# Patient Record
(demographics unavailable — no encounter records)

---

## 2025-04-23 NOTE — HISTORY OF PRESENT ILLNESS
[FreeTextEntry1] : Right neck mass [de-identified] : Izzy Baker is a 60 y.o. F PMH HTN presenting for a new patient visit. She recently was hospitalized from April 12-17th 2025 for a R neck mass that was biopsied and found to be papillary thyroid carcinoma. CT chest was performed with concerning findings for metastasis. She says she feels well and has no complaints other than concerns about the recent findings. Her and her daughter both said they were told of the cancer diagnosis and that they have hope that with treatment there will be improvement. I asked if they were told of the lung CT findings, in which she said that her lungs are affected but she was not completely sure of with what. Other than this, she has no active issues today.

## 2025-04-23 NOTE — PHYSICAL EXAM
[Alert and Oriented x3] : oriented to person, place, and time [Normal] : affect was normal and insight and judgment were intact [de-identified] : +R neck mass, tender to palpation

## 2025-04-23 NOTE — HISTORY OF PRESENT ILLNESS
[FreeTextEntry1] : Right neck mass [de-identified] : Izzy Baker is a 60 y.o. F PMH HTN presenting for a new patient visit. She recently was hospitalized from April 12-17th 2025 for a R neck mass that was biopsied and found to be papillary thyroid carcinoma. CT chest was performed with concerning findings for metastasis. She says she feels well and has no complaints other than concerns about the recent findings. Her and her daughter both said they were told of the cancer diagnosis and that they have hope that with treatment there will be improvement. I asked if they were told of the lung CT findings, in which she said that her lungs are affected but she was not completely sure of with what. Other than this, she has no active issues today.

## 2025-04-23 NOTE — INTERPRETER SERVICES
[Time Spent: ____ minutes] : Total time spent using  services: [unfilled] minutes. The patient's primary language is not English thus required  services. [Language Line ] : provided by Language Line   [Interpreters_IDNumber] : [id number] [Interpreters_FullName] : Antolin [TWNoteComboBox1] : Singaporean

## 2025-04-23 NOTE — HEALTH RISK ASSESSMENT
[Very Good] : ~his/her~  mood as very good [1 or 2 (0 pts)] : 1 or 2 (0 points) [Never (0 pts)] : Never (0 points) [No] : In the past 12 months have you used drugs other than those required for medical reasons? No [No falls in past year] : Patient reported no falls in the past year [0] : 2) Feeling down, depressed, or hopeless: Not at all (0) [PHQ-2 Negative - No further assessment needed] : PHQ-2 Negative - No further assessment needed [Access to Medications] : access to medications [With Family] : lives with family [# of Members in Household ___] :  household currently consist of [unfilled] member(s) [Unemployed] : unemployed [Less Than High School] : less than high school [] :  [# Of Children ___] : has [unfilled] children [Sexually Active] : sexually active [Feels Safe at Home] : Feels safe at home [Fully functional (bathing, dressing, toileting, transferring, walking, feeding)] : Fully functional (bathing, dressing, toileting, transferring, walking, feeding) [Fully functional (using the telephone, shopping, preparing meals, housekeeping, doing laundry, using] : Fully functional and needs no help or supervision to perform IADLs (using the telephone, shopping, preparing meals, housekeeping, doing laundry, using transportation, managing medications and managing finances) [Smoke Detector] : smoke detector [Carbon Monoxide Detector] : carbon monoxide detector [Seat Belt] :  uses seat belt [Sunscreen] : uses sunscreen [Never] : Never [NO] : No [ SDOH Screening] : Social service referral provided to patient and briefly addressed in the office. [Time Spent: ___ Minutes] : I spent [unfilled] minutes performing an SDOH assessment. [Reports normal functional visual acuity (ie: able to read med bottle)] : Reports normal functional visual acuity [Safety elements used in home] : safety elements used in home [FreeTextEntry1] : The neck mass [de-identified] : Recent hospitalization [de-identified] : None [de-identified] : Likes to walk  [de-identified] : Likes to eat everything she says  [EyeExamDate] : Never [Change in mental status noted] : No change in mental status noted [High Risk Behavior] : no high risk behavior [Reports changes in hearing] : Reports no changes in hearing [Reports changes in vision] : Reports no changes in vision [Reports changes in dental health] : Reports no changes in dental health [MammogramComments] : Never  [PapSmearComments] : Never  [ColonoscopyComments] : Never  [HIVComments] : Never [HepatitisCComments] : Never [de-identified] : Problem affording medication [de-identified] : trouble affording medication [de-identified] : unable to take necessary medications [de-identified] : enrolled in [facility] program

## 2025-04-23 NOTE — HEALTH RISK ASSESSMENT
[Very Good] : ~his/her~  mood as very good [1 or 2 (0 pts)] : 1 or 2 (0 points) [Never (0 pts)] : Never (0 points) [No] : In the past 12 months have you used drugs other than those required for medical reasons? No [No falls in past year] : Patient reported no falls in the past year [0] : 2) Feeling down, depressed, or hopeless: Not at all (0) [PHQ-2 Negative - No further assessment needed] : PHQ-2 Negative - No further assessment needed [Access to Medications] : access to medications [With Family] : lives with family [# of Members in Household ___] :  household currently consist of [unfilled] member(s) [Unemployed] : unemployed [Less Than High School] : less than high school [] :  [# Of Children ___] : has [unfilled] children [Sexually Active] : sexually active [Feels Safe at Home] : Feels safe at home [Fully functional (bathing, dressing, toileting, transferring, walking, feeding)] : Fully functional (bathing, dressing, toileting, transferring, walking, feeding) [Fully functional (using the telephone, shopping, preparing meals, housekeeping, doing laundry, using] : Fully functional and needs no help or supervision to perform IADLs (using the telephone, shopping, preparing meals, housekeeping, doing laundry, using transportation, managing medications and managing finances) [Smoke Detector] : smoke detector [Carbon Monoxide Detector] : carbon monoxide detector [Seat Belt] :  uses seat belt [Sunscreen] : uses sunscreen [Never] : Never [NO] : No [ SDOH Screening] : Social service referral provided to patient and briefly addressed in the office. [Time Spent: ___ Minutes] : I spent [unfilled] minutes performing an SDOH assessment. [Reports normal functional visual acuity (ie: able to read med bottle)] : Reports normal functional visual acuity [Safety elements used in home] : safety elements used in home [FreeTextEntry1] : The neck mass [de-identified] : Recent hospitalization [de-identified] : None [de-identified] : Likes to walk  [de-identified] : Likes to eat everything she says  [EyeExamDate] : Never [Change in mental status noted] : No change in mental status noted [High Risk Behavior] : no high risk behavior [Reports changes in hearing] : Reports no changes in hearing [Reports changes in vision] : Reports no changes in vision [Reports changes in dental health] : Reports no changes in dental health [MammogramComments] : Never  [PapSmearComments] : Never  [ColonoscopyComments] : Never  [HIVComments] : Never [HepatitisCComments] : Never [de-identified] : Problem affording medication [de-identified] : trouble affording medication [de-identified] : unable to take necessary medications [de-identified] : enrolled in [facility] program

## 2025-04-23 NOTE — INTERPRETER SERVICES
[Time Spent: ____ minutes] : Total time spent using  services: [unfilled] minutes. The patient's primary language is not English thus required  services. [Language Line ] : provided by [employer]   [Interpreters_IDNumber] : [id number] [Interpreters_FullName] : Antolin [TWNoteComboBox1] : Togolese

## 2025-04-23 NOTE — HEALTH RISK ASSESSMENT
[Very Good] : ~his/her~  mood as very good [1 or 2 (0 pts)] : 1 or 2 (0 points) [Never (0 pts)] : Never (0 points) [No] : In the past 12 months have you used drugs other than those required for medical reasons? No [No falls in past year] : Patient reported no falls in the past year [0] : 2) Feeling down, depressed, or hopeless: Not at all (0) [PHQ-2 Negative - No further assessment needed] : PHQ-2 Negative - No further assessment needed [Access to Medications] : access to medications [With Family] : lives with family [# of Members in Household ___] :  household currently consist of [unfilled] member(s) [Unemployed] : unemployed [Less Than High School] : less than high school [] :  [# Of Children ___] : has [unfilled] children [Sexually Active] : sexually active [Feels Safe at Home] : Feels safe at home [Fully functional (bathing, dressing, toileting, transferring, walking, feeding)] : Fully functional (bathing, dressing, toileting, transferring, walking, feeding) [Fully functional (using the telephone, shopping, preparing meals, housekeeping, doing laundry, using] : Fully functional and needs no help or supervision to perform IADLs (using the telephone, shopping, preparing meals, housekeeping, doing laundry, using transportation, managing medications and managing finances) [Smoke Detector] : smoke detector [Carbon Monoxide Detector] : carbon monoxide detector [Seat Belt] :  uses seat belt [Sunscreen] : uses sunscreen [Never] : Never [NO] : No [ SDOH Screening] : Social service referral provided to patient and briefly addressed in the office. [Time Spent: ___ Minutes] : I spent [unfilled] minutes performing an SDOH assessment. [Reports normal functional visual acuity (ie: able to read med bottle)] : Reports normal functional visual acuity [Safety elements used in home] : safety elements used in home [FreeTextEntry1] : The neck mass [de-identified] : Recent hospitalization [de-identified] : None [de-identified] : Likes to walk  [de-identified] : Likes to eat everything she says  [EyeExamDate] : Never [Change in mental status noted] : No change in mental status noted [High Risk Behavior] : no high risk behavior [Reports changes in hearing] : Reports no changes in hearing [Reports changes in vision] : Reports no changes in vision [Reports changes in dental health] : Reports no changes in dental health [MammogramComments] : Never  [PapSmearComments] : Never  [ColonoscopyComments] : Never  [HIVComments] : Never [HepatitisCComments] : Never [de-identified] : Problem affording medication [de-identified] : trouble affording medication [de-identified] : unable to take necessary medications [de-identified] : enrolled in [facility] program

## 2025-04-23 NOTE — PHYSICAL EXAM
[Alert and Oriented x3] : oriented to person, place, and time [Normal] : affect was normal and insight and judgment were intact [de-identified] : +R neck mass, tender to palpation

## 2025-04-23 NOTE — ASSESSMENT
[Vaccines Reviewed] : Immunizations reviewed today. Please see immunization details in the vaccine log within the immunization flowsheet.  [FreeTextEntry1] : Izzy Baker is a 60 y.o. F PMH HTN presenting for a new patient visit. She has a recent diagnosis of papillary thyroid carcinoma with CT chest findings concerns for metastatic. Other than this, she says she feels well and has no concerns.   #Papillary thyroid carcinoma #Concern for lung mets - TSH and fT4 ordered - Has a new patient appointment with oncologist Dr. Wheeler on May 2nd 2025. Pt aware of appt.  #HTN - C/w chlorthalidone  #HCM - Mammo ordered - GI referral for colonoscopy - ObGyn referral for pap - Pt did not wish for vaccines today. Will revisit. - Basic labs ordered  RTC 5 weeks TeleHealth for follow-up after oncology appt  Discussed case with Dr Derrick Castaneda MD PGY-1 Internal Medicine

## 2025-04-23 NOTE — PHYSICAL EXAM
[Alert and Oriented x3] : oriented to person, place, and time [Normal] : affect was normal and insight and judgment were intact [de-identified] : +R neck mass, tender to palpation

## 2025-04-23 NOTE — INTERPRETER SERVICES
[Time Spent: ____ minutes] : Total time spent using  services: [unfilled] minutes. The patient's primary language is not English thus required  services. [Language Line ] : provided by [employer]   [Interpreters_IDNumber] : [id number] [Interpreters_FullName] : Antolin [TWNoteComboBox1] : [place]

## 2025-04-23 NOTE — HISTORY OF PRESENT ILLNESS
[FreeTextEntry1] : Right neck mass [de-identified] : Izzy Baker is a 60 y.o. F PMH HTN presenting for a new patient visit. She recently was hospitalized from April 12-17th 2025 for a R neck mass that was biopsied and found to be papillary thyroid carcinoma. CT chest was performed with concerning findings for metastasis. She says she feels well and has no complaints other than concerns about the recent findings. Her and her daughter both said they were told of the cancer diagnosis and that they have hope that with treatment there will be improvement. I asked if they were told of the lung CT findings, in which she said that her lungs are affected but she was not completely sure of with what. Other than this, she has no active issues today.

## 2025-04-29 NOTE — CONSULT LETTER
[Dear  ___] : Dear  [unfilled], [Consult Letter:] : I had the pleasure of evaluating your patient, [unfilled]. [Please see my note below.] : Please see my note below. [Consult Closing:] : Thank you very much for allowing me to participate in the care of this patient.  If you have any questions, please do not hesitate to contact me. [Sincerely,] : Sincerely, [FreeTextEntry2] : Dr. Yayo Castaneda  [FreeTextEntry3] : Jos Harp MD FACS Division of Head and Neck Surgery Department of Otolaryngology  [facility]   of Otolaryngology Assistant Professor of Surgery [facility] [employer] at [facility]

## 2025-04-29 NOTE — REASON FOR VISIT
[Initial Evaluation] : an initial evaluation for [Other: ______] : provided by [facility] [FreeTextEntry2] : mets of PTCs with neck mass [Interpreters_IDNumber] : [id number] [Interpreters_FullName] : josr

## 2025-04-29 NOTE — REASON FOR VISIT
[Initial Evaluation] : an initial evaluation for [Other: ______] : provided by [facility] [FreeTextEntry2] : mets of PTCs with neck mass [Interpreters_IDNumber] : [id number] [Interpreters_FullName] : josr

## 2025-04-29 NOTE — PROCEDURE
[Gag Reflex] : gag reflex preventing mirror examination [None] : none [Flexible Endoscope] : examined with the flexible endoscope [Serial Number: ___] : Serial Number: [unfilled] [de-identified] : After patient consents, a FFL is performed.  No lesions in the NPx, OPx, HPx or larynx.  VC are mobile, airway patent.

## 2025-04-29 NOTE — DATA REVIEWED
[de-identified] : CT Neck independently interpreted - right neck with bulky conglomerate nodes in level IV and V, left neck with pathologic nodes in level IV.

## 2025-04-29 NOTE — PHYSICAL EXAM
[de-identified] : Approx. 5-6 cm mass in right level IV/V, firm approx. 2.5 cm mass in left level IV.  Both mobile, no LAD.   [Midline] : trachea located in midline position [Laryngoscopy Performed] : laryngoscopy was performed, see procedure section for findings [Normal] : no rashes

## 2025-04-29 NOTE — DATA REVIEWED
[de-identified] : CT Neck independently interpreted - right neck with bulky conglomerate nodes in level IV and V, left neck with pathologic nodes in level IV.

## 2025-04-29 NOTE — CONSULT LETTER
[Dear  ___] : Dear  [unfilled], [Consult Letter:] : I had the pleasure of evaluating your patient, [unfilled]. [Please see my note below.] : Please see my note below. [Consult Closing:] : Thank you very much for allowing me to participate in the care of this patient.  If you have any questions, please do not hesitate to contact me. [Sincerely,] : Sincerely, [FreeTextEntry2] : Dr. Yayo Castaneda  [FreeTextEntry3] : Jos Harp MD FACS Division of Head and Neck Surgery Department of Otolaryngology  [facility]   of Otolaryngology Assistant Professor of Surgery [employer] [facility] [employer] at [facility]

## 2025-04-29 NOTE — PROCEDURE
Patient is on medical hold and unable to be seen by Cardiac Rehabilitation at this time. Will follow to progress.     [Gag Reflex] : gag reflex preventing mirror examination [None] : none [Flexible Endoscope] : examined with the flexible endoscope [Serial Number: ___] : Serial Number: [unfilled] [de-identified] : After patient consents, a FFL is performed.  No lesions in the NPx, OPx, HPx or larynx.  VC are mobile, airway patent.

## 2025-04-29 NOTE — HISTORY OF PRESENT ILLNESS
[de-identified] : Patient referred by [facility] for new diag mets thyroid cancer.  R neck mass that was biopsied and found to be papillary thyroid carcinoma on 4/19/2025 sono 4/12/2025 , CT neck 4/2025  , CT chest was performed with concerning findings for metastasis PT is here to surgical consultation Pt c.o right neck mass swelling up for 5 months, slowly getting bigger, mild pain. Denies  dysphagia, odynophagia, dyspnea, dysphonia, nasal obstruction/bleeding, fever, weakness or weight loss.  no family of thyroid cancer or thyroid disease no exposure of radiation. non smoker

## 2025-04-29 NOTE — PHYSICAL EXAM
[de-identified] : Approx. 5-6 cm mass in right level IV/V, firm approx. 2.5 cm mass in left level IV.  Both mobile, no LAD.   [Midline] : trachea located in midline position [Laryngoscopy Performed] : laryngoscopy was performed, see procedure section for findings [Normal] : no rashes

## 2025-04-29 NOTE — HISTORY OF PRESENT ILLNESS
[de-identified] : Patient referred by [facility] for new diag mets thyroid cancer.  R neck mass that was biopsied and found to be papillary thyroid carcinoma on 4/19/2025 sono 4/12/2025 , CT neck 4/2025  , CT chest was performed with concerning findings for metastasis PT is here to surgical consultation Pt c.o right neck mass swelling up for 5 months, slowly getting bigger, mild pain. Denies  dysphagia, odynophagia, dyspnea, dysphonia, nasal obstruction/bleeding, fever, weakness or weight loss.  no family of thyroid cancer or thyroid disease no exposure of radiation. non smoker

## 2025-05-04 NOTE — ASSESSMENT
[FreeTextEntry1] : 60F presenting to medical oncology for an initial consultation for management of metastatic papillary thyroid cancer.    # Papillary thyroid carcinoma, metastatic  - Reviewed available laboratory, radiographic, and pathologic data with the patient and her daughter, using a  (see HPI for details), and in layman's terms.  - Expressed concern given the rate of growth of the disease, will ask pathology to add on BRAF to biopsy specimen.  - Explained the metastatic nature of her disease and overview of the recommended treatment, including surgery with ENT including total thyroidectomy and neck dissection, followed by DOMINGO, and eventually therapy with oral agents.  - Ordered Guardant 360  - Will obtain thyroid and baseline labs - Needs medical clearance for surgery, has appt with IM prior to proceeding to surgery.  - Is on a 6mo tourist visa, may need to set up ongoing cancer care in [facility] at the end of her visa.  - All questions and concerns answered to the best of my ability and to the patient's apparent satisfaction.  - Mason have telehealth visit second week of May to discuss NGS.

## 2025-05-04 NOTE — PHYSICAL EXAM
[Restricted in physically strenuous activity but ambulatory and able to carry out work of a light or sedentary nature] : Status 1- Restricted in physically strenuous activity but ambulatory and able to carry out work of a light or sedentary nature, e.g., light house work, office work [Normal] : affect appropriate [Ulcers] : no ulcers [Mucositis] : no mucositis [Thrush] : no thrush [de-identified] : Bulky R neck mass

## 2025-05-04 NOTE — HISTORY OF PRESENT ILLNESS
[Disease: _____________________] : Disease: [unfilled] [M: ___] : M[unfilled] [AJCC Stage: ____] : AJCC Stage: [unfilled] [ECOG Performance Status: 1 - Restricted in physically strenuous activity but ambulatory and able to carry out work of a light or sedentary nature] : Performance Status: 1 - Restricted in physically strenuous activity but ambulatory and able to carry out work of a light or sedentary nature, e.g., light house work, office work [de-identified] :  Marilynn [id number]  Patient is a 60-year-old Kyrgyz-speaking female from [facility] with a past medical history of hypertension and is presenting to medical oncology for an initial conusltation regarding management for a new diagnosis of metastatic thyroid cancer. She states she noticed a right neck mass about 5-6 months ago that has been progressively growing and now causing mild discomfort. She was admitted to [facility] 4/12/25 work further work-up. There, she was found to have level 4 lymphadenopathy, the largest a 4.3 x 4.7 x 4.6 cm nectrotic/cystic mass. A 1.7cm right thyroid mass also seen. CT chest with innumerable pulmonary metastases. She had a lymph node biopsy on 4/16/25 which revealed metastatic papillary thyroid carcinoma. She was evaluated by ENT Dr. Bethea who recommended thyroidectomy and bilateral neck dissection followed by DOMINGO. She has bulky disease. Denies issues with breathig or swallowing. No family history of cancers or blood diseases. Non-smoker, no EtOH or other drugs. No history of radiation or toxin exposure.  [de-identified] : Papillary Thyroid Carcinoma  [de-identified] : TSH 9.04, free T4 0.8 [de-identified] : 5/2/25: Initial visit with medical oncology.

## 2025-05-04 NOTE — PHYSICAL EXAM
[Restricted in physically strenuous activity but ambulatory and able to carry out work of a light or sedentary nature] : Status 1- Restricted in physically strenuous activity but ambulatory and able to carry out work of a light or sedentary nature, e.g., light house work, office work [Normal] : affect appropriate [Ulcers] : no ulcers [Mucositis] : no mucositis [Thrush] : no thrush [de-identified] : Bulky R neck mass

## 2025-05-04 NOTE — ASSESSMENT
[FreeTextEntry1] : 60F presenting to medical oncology for an initial consultation for management of metastatic papillary thyroid cancer.    # Papillary thyroid carcinoma, metastatic  - Reviewed available laboratory, radiographic, and pathologic data with the patient and her daughter, using a  (see HPI for details), and in layman's terms.  - Expressed concern given the rate of growth of the disease, will ask pathology to add on BRAF to biopsy specimen.  - Explained the metastatic nature of her disease and overview of the recommended treatment, including surgery with ENT including total thyroidectomy and neck dissection, followed by DOMINGO, and eventually therapy with oral agents.  - Ordered Guardant 360  - Will obtain thyroid and baseline labs - Needs medical clearance for surgery, has appt with IM prior to proceeding to surgery.  - Is on a 6mo tourist visa, may need to set up ongoing cancer care in [facility] at the end of her visa.  - All questions and concerns answered to the best of my ability and to the patient's apparent satisfaction.  - Mason have telehealth visit second week of May to discuss NGS.

## 2025-05-04 NOTE — HISTORY OF PRESENT ILLNESS
[Disease: _____________________] : Disease: [unfilled] [M: ___] : M[unfilled] [AJCC Stage: ____] : AJCC Stage: [unfilled] [ECOG Performance Status: 1 - Restricted in physically strenuous activity but ambulatory and able to carry out work of a light or sedentary nature] : Performance Status: 1 - Restricted in physically strenuous activity but ambulatory and able to carry out work of a light or sedentary nature, e.g., light house work, office work [de-identified] :  Marilynn [id number]  Patient is a 60-year-old Upper sorbian-speaking female from [facility] with a past medical history of hypertension and is presenting to medical oncology for an initial conusltation regarding management for a new diagnosis of metastatic thyroid cancer. She states she noticed a right neck mass about 5-6 months ago that has been progressively growing and now causing mild discomfort. She was admitted to [facility] 4/12/25 work further work-up. There, she was found to have level 4 lymphadenopathy, the largest a 4.3 x 4.7 x 4.6 cm nectrotic/cystic mass. A 1.7cm right thyroid mass also seen. CT chest with innumerable pulmonary metastases. She had a lymph node biopsy on 4/16/25 which revealed metastatic papillary thyroid carcinoma. She was evaluated by ENT Dr. Bethea who recommended thyroidectomy and bilateral neck dissection followed by DOMINGO. She has bulky disease. Denies issues with breathig or swallowing. No family history of cancers or blood diseases. Non-smoker, no EtOH or other drugs. No history of radiation or toxin exposure.  [de-identified] : Papillary Thyroid Carcinoma  [de-identified] : TSH 9.04, free T4 0.8 [de-identified] : 5/2/25: Initial visit with medical oncology.

## 2025-05-05 NOTE — PLAN
[FreeTextEntry1] : #Pre-op clearance - pt advised to hold chlorthalidone morning of surgery - ekg today - pt had recent blood work done, nothing abnormal on bmp

## 2025-05-05 NOTE — ASSESSMENT
[Patient Optimized for Surgery] : Patient optimized for surgery [No Further Testing Recommended] : no further testing recommended [FreeTextEntry4] : Pt is a 60 Kyrgyz speaking F from [facility] with HTN and recent diagnosis of metastatic papillary thyroid cancer presenting for follow up after seeing oncology and ENT for surgical clearance for thyroidectomy and bilateral neck dissection. Pt has no other medical conditions, has excellent functional capacity. Medically optimized for surgery.

## 2025-05-05 NOTE — INTERPRETER SERVICES
[Language Line ] : provided by [employer]   [Interpreters_IDNumber] : [id number] [Interpreters_FullName] : Easton Howell

## 2025-05-05 NOTE — HISTORY OF PRESENT ILLNESS
[de-identified] : Pt is a 60 Luxembourgish speaking F from [facility] with HTN and recent diagnosis of metastatic papillary thyroid cancer presenting for follow up after seeing oncology and ENT for surgical clearance. Pt feeling well overall in general, denies any symptoms. No lightheadednesss/dizziness, CP/SOB, abdominal pain, palpitations, diarrhea/constipation. ENT Dr. Bethea recommended thyroidectomy and bilateral neck dissection followed for DOMINGO. Denies issues with breathing/swallowing. Recently followed up with Dr. Wheeler, who explained the metastatic nature of disease and reviewed the recommended treatment.  [No Pertinent Cardiac History] : no history of aortic stenosis, atrial fibrillation, coronary artery disease, recent myocardial infarction, or implantable device/pacemaker [No Pertinent Pulmonary History] : no history of asthma, COPD, sleep apnea, or smoking [(Patient denies any chest pain, claudication, dyspnea on exertion, orthopnea, palpitations or syncope)] : Patient denies any chest pain, claudication, dyspnea on exertion, orthopnea, palpitations or syncope [Excellent (>10 METs)] : Excellent (>10 METs) [Family Member] : family member [Aortic Stenosis] : no aortic stenosis [Atrial Fibrillation] : no atrial fibrillation [Coronary Artery Disease] : no coronary artery disease [Recent Myocardial Infarction] : no recent myocardial infarction [Implantable Device/Pacemaker] : no implantable device/pacemaker [Asthma] : no asthma [COPD] : no COPD [Sleep Apnea] : no sleep apnea [Smoker] : not a smoker [Chronic Anticoagulation] : no chronic anticoagulation [Chronic Kidney Disease] : no chronic kidney disease [Diabetes] : no diabetes [FreeTextEntry1] : thyroidectomy and bilateral neck dissection [FreeTextEntry3] : Dr. Bethea [FreeTextEntry4] : Pt is a 60 Yemeni speaking F from [facility] with HTN and recent diagnosis of metastatic papillary thyroid cancer presenting for follow up after seeing oncology and ENT for surgical clearance. Pt feeling well overall in general, denies any symptoms. No lightheadedness/dizziness, CP/SOB, abdominal pain, palpitations, diarrhea/constipation. ENT Dr. Bethea recommended thyroidectomy and bilateral neck dissection followed for DOMINGO. Denies issues with breathing/swallowing. Recently followed up with Dr. Wheeler, who explained the metastatic nature of disease and reviewed the recommended treatment.

## 2025-05-05 NOTE — PHYSICAL EXAM
[No Acute Distress] : no acute distress [Well Nourished] : well nourished [Normal Sclera/Conjunctiva] : normal sclera/conjunctiva [PERRL] : pupils equal round and reactive to light [Normal Outer Ear/Nose] : the outer ears and nose were normal in appearance [Normal Oropharynx] : the oropharynx was normal [No JVD] : no jugular venous distention [No Respiratory Distress] : no respiratory distress  [No Accessory Muscle Use] : no accessory muscle use [Clear to Auscultation] : lungs were clear to auscultation bilaterally [Normal Rate] : normal rate  [Regular Rhythm] : with a regular rhythm [Normal S1, S2] : normal S1 and S2 [No Edema] : there was no peripheral edema [Soft] : abdomen soft [Non Tender] : non-tender [No HSM] : no HSM [No CVA Tenderness] : no CVA  tenderness [No Spinal Tenderness] : no spinal tenderness [No Joint Swelling] : no joint swelling [Grossly Normal Strength/Tone] : grossly normal strength/tone [No Rash] : no rash [Coordination Grossly Intact] : coordination grossly intact [No Focal Deficits] : no focal deficits [Normal Gait] : normal gait [Normal Affect] : the affect was normal [Normal Insight/Judgement] : insight and judgment were intact [de-identified] : Approx. 5-6 cm firm mobile mass on right side of neck

## 2025-05-05 NOTE — INTERPRETER SERVICES
[Language Line ] : provided by [employer]   [Interpreters_IDNumber] : [id number] [Interpreters_FullName] : Easton Howell

## 2025-05-05 NOTE — HISTORY OF PRESENT ILLNESS
[de-identified] : Pt is a 60 Bengali speaking F from [facility] with HTN and recent diagnosis of metastatic papillary thyroid cancer presenting for follow up after seeing oncology and ENT for surgical clearance. Pt feeling well overall in general, denies any symptoms. No lightheadednesss/dizziness, CP/SOB, abdominal pain, palpitations, diarrhea/constipation. ENT Dr. Bethea recommended thyroidectomy and bilateral neck dissection followed for DOMINGO. Denies issues with breathing/swallowing. Recently followed up with Dr. Wheeler, who explained the metastatic nature of disease and reviewed the recommended treatment.  [No Pertinent Cardiac History] : no history of aortic stenosis, atrial fibrillation, coronary artery disease, recent myocardial infarction, or implantable device/pacemaker [No Pertinent Pulmonary History] : no history of asthma, COPD, sleep apnea, or smoking [(Patient denies any chest pain, claudication, dyspnea on exertion, orthopnea, palpitations or syncope)] : Patient denies any chest pain, claudication, dyspnea on exertion, orthopnea, palpitations or syncope [Excellent (>10 METs)] : Excellent (>10 METs) [Family Member] : family member [Aortic Stenosis] : no aortic stenosis [Atrial Fibrillation] : no atrial fibrillation [Coronary Artery Disease] : no coronary artery disease [Recent Myocardial Infarction] : no recent myocardial infarction [Implantable Device/Pacemaker] : no implantable device/pacemaker [Asthma] : no asthma [COPD] : no COPD [Sleep Apnea] : no sleep apnea [Smoker] : not a smoker [Chronic Anticoagulation] : no chronic anticoagulation [Chronic Kidney Disease] : no chronic kidney disease [Diabetes] : no diabetes [FreeTextEntry1] : thyroidectomy and bilateral neck dissection [FreeTextEntry3] : Dr. Bethea [FreeTextEntry4] : Pt is a 60 Bahamian speaking F from [facility] with HTN and recent diagnosis of metastatic papillary thyroid cancer presenting for follow up after seeing oncology and ENT for surgical clearance. Pt feeling well overall in general, denies any symptoms. No lightheadedness/dizziness, CP/SOB, abdominal pain, palpitations, diarrhea/constipation. ENT Dr. Bethea recommended thyroidectomy and bilateral neck dissection followed for DOMINGO. Denies issues with breathing/swallowing. Recently followed up with Dr. Wheeler, who explained the metastatic nature of disease and reviewed the recommended treatment.

## 2025-05-05 NOTE — REVIEW OF SYSTEMS
[Fever] : no fever [Chills] : no chills [Night Sweats] : no night sweats [Recent Change In Weight] : ~T no recent weight change [Discharge] : no discharge [Pain] : no pain [Vision Problems] : no vision problems [Earache] : no earache [Hearing Loss] : no hearing loss [Sore Throat] : no sore throat [Chest Pain] : no chest pain [Orthopnea] : no orthopnea [Shortness Of Breath] : no shortness of breath [Wheezing] : no wheezing [Cough] : no cough [Abdominal Pain] : no abdominal pain [Nausea] : no nausea [Vomiting] : no vomiting [Dysuria] : no dysuria [Frequency] : no frequency [Joint Pain] : no joint pain [Muscle Pain] : no muscle pain [Itching] : no itching [Skin Rash] : no skin rash [Headache] : no headache [Dizziness] : no dizziness [Memory Loss] : no memory loss [Suicidal] : not suicidal [Insomnia] : no insomnia [Easy Bleeding] : no easy bleeding [Easy Bruising] : no easy bruising

## 2025-05-05 NOTE — PHYSICAL EXAM
[No Acute Distress] : no acute distress [Well Nourished] : well nourished [Normal Sclera/Conjunctiva] : normal sclera/conjunctiva [PERRL] : pupils equal round and reactive to light [Normal Outer Ear/Nose] : the outer ears and nose were normal in appearance [Normal Oropharynx] : the oropharynx was normal [No JVD] : no jugular venous distention [No Respiratory Distress] : no respiratory distress  [No Accessory Muscle Use] : no accessory muscle use [Clear to Auscultation] : lungs were clear to auscultation bilaterally [Normal Rate] : normal rate  [Regular Rhythm] : with a regular rhythm [Normal S1, S2] : normal S1 and S2 [No Edema] : there was no peripheral edema [Soft] : abdomen soft [Non Tender] : non-tender [No HSM] : no HSM [No CVA Tenderness] : no CVA  tenderness [No Spinal Tenderness] : no spinal tenderness [No Joint Swelling] : no joint swelling [Grossly Normal Strength/Tone] : grossly normal strength/tone [No Rash] : no rash [Coordination Grossly Intact] : coordination grossly intact [No Focal Deficits] : no focal deficits [Normal Gait] : normal gait [Normal Affect] : the affect was normal [Normal Insight/Judgement] : insight and judgment were intact [de-identified] : Approx. 5-6 cm firm mobile mass on right side of neck

## 2025-05-05 NOTE — ASSESSMENT
[Patient Optimized for Surgery] : Patient optimized for surgery [No Further Testing Recommended] : no further testing recommended [FreeTextEntry4] : Pt is a 60 Polish speaking F from [facility] with HTN and recent diagnosis of metastatic papillary thyroid cancer presenting for follow up after seeing oncology and ENT for surgical clearance for thyroidectomy and bilateral neck dissection. Pt has no other medical conditions, has excellent functional capacity. Medically optimized for surgery.

## 2025-05-13 NOTE — ASSESSMENT
[FreeTextEntry1] : 60F presenting to medical oncology for an initial consultation for management of metastatic papillary thyroid cancer.    # Papillary thyroid carcinoma, metastatic  - Reviewed available laboratory, radiographic, and pathologic data with the patient and her daughter, using a  (see HPI for details), and in layman's terms.  - Expressed concern given the rate of growth of the disease, will ask pathology to add on BRAF to biopsy specimen.  - Explained the metastatic nature of her disease and overview of the recommended treatment, including surgery with ENT including total thyroidectomy and neck dissection, followed by DOMINGO, and eventually therapy with oral agents.  - Ordered Guardant 360  - Will obtain thyroid and baseline labs - Needs medical clearance for surgery, has appt with IM prior to proceeding to surgery.  - Is on a 6mo tourist visa, may need to set up ongoing cancer care in [facility] at the end of her visa.  - All questions and concerns answered to the best of my ability and to the patient's apparent satisfaction.  - Mason have telehealth visit second week of May to discuss NGS.   5/13/25 had med clearance plan for surgery mid -May Guardant NGS pending will follow up after surgery all questions answered.

## 2025-05-13 NOTE — HISTORY OF PRESENT ILLNESS
[Disease: _____________________] : Disease: [unfilled] [M: ___] : M[unfilled] [AJCC Stage: ____] : AJCC Stage: [unfilled] [ECOG Performance Status: 1 - Restricted in physically strenuous activity but ambulatory and able to carry out work of a light or sedentary nature] : Performance Status: 1 - Restricted in physically strenuous activity but ambulatory and able to carry out work of a light or sedentary nature, e.g., light house work, office work [de-identified] :  Marilynn [id number]  Patient is a 60-year-old Chinese-speaking female from [facility] with a past medical history of hypertension and is presenting to medical oncology for an initial conusltation regarding management for a new diagnosis of metastatic thyroid cancer. She states she noticed a right neck mass about 5-6 months ago that has been progressively growing and now causing mild discomfort. She was admitted to [facility] 4/12/25 work further work-up. There, she was found to have level 4 lymphadenopathy, the largest a 4.3 x 4.7 x 4.6 cm nectrotic/cystic mass. A 1.7cm right thyroid mass also seen. CT chest with innumerable pulmonary metastases. She had a lymph node biopsy on 4/16/25 which revealed metastatic papillary thyroid carcinoma. She was evaluated by ENT Dr. Bethea who recommended thyroidectomy and bilateral neck dissection followed by DOMINGO. She has bulky disease. Denies issues with breathig or swallowing. No family history of cancers or blood diseases. Non-smoker, no EtOH or other drugs. No history of radiation or toxin exposure.  [de-identified] : Papillary Thyroid Carcinoma  [de-identified] : TSH 9.04, free T4 0.8 [de-identified] : 5/2/25: Initial visit with medical oncology.   5/13/25: no new symptoms, persistent rt neck mass, no dysphagia, dyspnea or dysphonia

## 2025-05-13 NOTE — PHYSICAL EXAM
[Restricted in physically strenuous activity but ambulatory and able to carry out work of a light or sedentary nature] : Status 1- Restricted in physically strenuous activity but ambulatory and able to carry out work of a light or sedentary nature, e.g., light house work, office work [Normal] : affect appropriate [Ulcers] : no ulcers [Mucositis] : no mucositis [Thrush] : no thrush [de-identified] : Bulky R neck mass

## 2025-05-19 NOTE — DISCUSSION/SUMMARY
[FreeTextEntry1] : 61 y/o F  with last menstrual period at 50 yrs of age with PMHx HTN, HLD and papillary thyroid carcinoma presents for new exam for pap smear d/t surgery on 25 for thyroid cancer that requires a pap smear. Pt with no previous pap smears. Mammogram performed 15 days ago with normal results. Pt with no DEXA or colonoscopy performed. Pt sexually active with , no complaints. Denies abnormal bleeding, abdominal pain, hematuria, dysuria, incontinence, masses.  #PAP smear/HPV - performed will f/u with results in about 2 weeks  #Mammogram - performed 15 days ago with negative findings per pt.   #DEXA - f/u for DEXA scan   #GI referral - f/u for colonoscopy  #PHQ9 - performed with result of 0. Spoke face to face for 5 minutes with no concerns of depression or anxiety. Will continue to monitor  Explained to pt plan and management and told to return to clinic with any abnormal vaginal bleeding or vaginal complaints, such as pain, dryness, dysuria/hematuria, etc. and will follow up for annual exam in 1 year. Pt agrees and has no further questions  JADIEL MagañaS Mendy, PAC

## 2025-05-19 NOTE — REASON FOR VISIT
[Initial] : an initial consultation for [Language Line ] : provided by [employer]   [Interpreters_IDNumber] : [id number] [Interpreters_FullName] : Keyanna

## 2025-05-19 NOTE — REVIEW OF SYSTEMS
Overall stable  Current meds include Wellbutrin, Effexor, Stelazine, and Seroquel    Continue follow-up with his psychiatrist as directed [Negative] : Heme/Lymph

## 2025-05-19 NOTE — HISTORY OF PRESENT ILLNESS
[FreeTextEntry1] : 59 y/o F  with last menstrual period at 50 years of age with PMHx HTN, HLD and papillary thyroid carcinoma presents for a new visit d/t surgery on 25 for thyroid cancer which required a pap smear. Pt has never received a pap smear before. Mammogram performed 15 days ago with no abnormal results. Has not seen gynecologist since birth of children. Pt sexually active with  no complaints. Denies hot flashes, headache, hematuria, dysuria, abnormal discharge, incontinence, abdominal pain, change in bowel movements, abnormal bleeding since menopause.    PMHx:  - HTN - HLD - Papillary thyroid carcinoma OBGYN hx: , vaginal deliveries in [facility], no complications Medications: unknown Hospitalizations/Surgeries: Denies Allergies: NKDA, NKEA, NKFA HCM: PCP with [facility]. Mammogram 15 days ago, normal results. Denies pap smear, DEXA scan, or colonoscopy FHx: Denies Social: Social alcohol use, denies cigarette, marijuana, illicit drug use or previous use. No previous STI or HIV testing. Sexually active with , no pain, vaginal dryness or bleeding noted. Pt feels safe and comfortable at home.

## 2025-05-19 NOTE — PHYSICAL EXAM
[Chaperoned Physical Exam] : A chaperone was present in the examining room during all aspects of the physical examination. [Appropriately responsive] : appropriately responsive [Alert] : alert [Thyroid Nodule] : thyroid nodule [Examination Of The Breasts] : a normal appearance [No Masses] : no breast masses were palpable [Vulvar Atrophy] : vulvar atrophy [Labia Majora] : normal [Labia Minora] : normal [Atrophy] : atrophy [Normal] : normal [Uterine Adnexae] : normal [FreeTextEntry2] : Bernadine Belcher [FreeTextEntry1] : LEWIS [No Acute Distress] : no acute distress [No Lymphadenopathy] : no lymphadenopathy [Soft] : soft [Non-tender] : non-tender [Non-distended] : non-distended [No HSM] : No HSM [No Lesions] : no lesions [No Mass] : no mass [Oriented x3] : oriented x3 [Normal Position] : in a normal position [Tenderness] : nontender [Enlarged ___ wks] : not enlarged

## 2025-05-28 NOTE — REASON FOR VISIT
[Subsequent Evaluation] : a subsequent evaluation for [FreeTextEntry2] : post total thyroidectomy and neck dissection [Family Member] : family member [Other: _____] : [unfilled]

## 2025-05-28 NOTE — HISTORY OF PRESENT ILLNESS
[de-identified] : 60y Female underwent TT, CND, B/L SND II-IV, reimplantation para 5/21. path pending   Pt is without any complaints and denies any pain or discomfort, or voice change. Pt also has no neck mass, no difficulty swallowing and no painful swallowing. no numbness or tingling, no change voice.  Pt is calcium carbonate 1250 mg (500 mg elemental calcium) oral tablet: 2 tab(s) TID, levothyroxine 112 mcg (0.112 mg) oral tablet: 1 tab(s) orally once a day, and Rocaltrol 0.25 mcg oral capsule: 1 cap(s) orally 2 times a day. Pt has an endo appt 7/31/2025 and will call Dr. Wheeler for an appt

## 2025-05-30 NOTE — ASSESSMENT
[FreeTextEntry1] : Izzy Kothari is a 60F PMG HTN and metastatic thyroid cancer s/p total thyroidectomy and neck dissection 5/21/25 presenting for a follow-up visit. No acute complaints today over TeleHealth and has been following up with med onc and ENT regarding thyroid cancer.  #Metastaic thyroid cancer -  S/p total thyroidectomy and neck dissection 5/21/25 - C/w levothyroxine 112 mcg QD - C/w oyster shell calcium 500mg two tabs TID - C/w calcitriol 0.25mg BID - F/u with med onc Dr Wheeler June 13th  #HTN - SBP noted to be evelated to 150s on May 19th - Before that reading was normotensive - Recommended to c/w chlorthalidone and to not eat salty foods - Recheck BP in 3 months  #HCM - Awaiting pap results from Gyn - Has GI appointment for screening colonoscopy  Discussed case with Dr. Rosas   RTC in 3 months for BP check  Yayo Castaneda MD PGY-1 Internal Medicine

## 2025-05-30 NOTE — HISTORY OF PRESENT ILLNESS
[Family Member] : family member [Home] : at home, [unfilled] , at the time of the visit. [Medical Office: ([facility])___] : at the medical office located in  [Telehealth (audio & video)] : This visit was provided via telehealth using real-time 2-way audio visual technology. [Verbal consent obtained from patient] : the patient, [unfilled] [FreeTextEntry1] : No chief complaint [de-identified] : Izzy Kothari is a 60F PMG HTN and metastatic thyroid cancer s/p total thyroidectomy and neck dissection 5/21/25 presenting for a follow-up visit. She says shes doing well after the surgery and has follow-up with med onc and ENT. Says She has been taking her new medications calcitriol, levothyroxine, and calcium. No new complaints today.

## 2025-06-10 NOTE — HISTORY OF PRESENT ILLNESS
[de-identified] : 60 year old woman s/p TT, CND, B/L SND II-IV, reimplantation para 5/21/2025 presents for follow-up.  Most recent labs 5/30/2025 Intact PTH 6; Calcium and Ionized Calcium WNL. Pt is calcium carbonate 1250 mg (500 mg elemental calcium) oral tablet: 2 tab(s) TID, levothyroxine 112 mcg (0.112 mg) oral tablet: 1 tab(s) orally once a day, and Calcitriol 0.25 mcg oral capsule: 1 cap(s) orally 2 times a day. Pt has an endo appt 7/31/2025 and Dr. Wheeler 6/13/2025 Patient reports Right neck pain/stiffness, Right shoulder pain and upper back pain since the surgery. Eating/drinking without any difficulty.  Patient denies globus sensation, dysphagia, odynophagia, dyspnea, dysphonia, hemoptysis or otalgia. Denies recent fevers/infections, chills, night sweats, unintentional weight loss.

## 2025-06-10 NOTE — PROCEDURE
[Gag Reflex] : gag reflex preventing mirror examination [None] : none [Flexible Endoscope] : examined with the flexible endoscope [Serial Number: ___] : Serial Number: [unfilled] [de-identified] : After patient consents, a FFL is performed.  No lesions in the NPx, OPx, HPx or larynx.  VC are mobile, airway patent.

## 2025-06-10 NOTE — PHYSICAL EXAM
[de-identified] : Incision healing well, postsurgical changes.  No collections, no LAD. [Midline] : trachea located in midline position [Laryngoscopy Performed] : laryngoscopy was performed, see procedure section for findings [Normal] : no rashes

## 2025-06-10 NOTE — REASON FOR VISIT
[Post-Operative Visit] : a post-operative visit [Language Line ] : provided by [employer]   [FreeTextEntry2] : Metastatic Papillary thyroid carcinoma to lymph nodes. [Interpreters_IDNumber] : [id number] [Interpreters_FullName] : Pippa [[id number]] : [place]

## 2025-06-13 NOTE — ASSESSMENT
[FreeTextEntry1] :   60 year old woman s/p TT, CND, B/L SND II-IV, reimplantation para 5/21/2025 presents for follow-up. Most recent labs 5/30/2025 Intact PTH 6; Calcium and Ionized Calcium WNL. Pt is calcium carbonate 1250 mg (500 mg elemental calcium) oral tablet: 2 tab(s) TID, levothyroxine 112 mcg (0.112 mg) oral tablet: 1 tab(s) orally once a day, and Calcitriol 0.25 mcg oral capsule: 1 cap(s) orally 2 times a day. Pt has an endo appt 7/31/2025  Patient reports Right neck pain/stiffness, Right shoulder pain and upper back pain since the surgery. Eating/drinking without any difficulty. Patient denies globus sensation, dysphagia, odynophagia, dyspnea, dysphonia, hemoptysis or otalgia. Denies recent fevers/infections, chills, night sweats, unintentional weight loss. High risk disease.  Refer to rad Onc per Dr. Harp, but also to nuclear med May just need DOMINGO Will discuss at TB plan for tandem PET and I-131 Request NGS on tumor  NGS per [facility] [id number] OV in 1 month Labs today

## 2025-06-13 NOTE — PHYSICAL EXAM
[Restricted in physically strenuous activity but ambulatory and able to carry out work of a light or sedentary nature] : Status 1- Restricted in physically strenuous activity but ambulatory and able to carry out work of a light or sedentary nature, e.g., light house work, office work [Normal] : affect appropriate [Ulcers] : no ulcers [Mucositis] : no mucositis [Thrush] : no thrush [de-identified] : healing well. no mass

## 2025-06-13 NOTE — HISTORY OF PRESENT ILLNESS
[place] ambulatory encounter  URGENT CARE OFFICE VISIT    SUBJECTIVE:  Abiel Oglesby is a 5 year old female who presented requesting evaluation for right earache for the last couple of days. No fever chills or sweats. No sore throat. No rhinorrhea. Occasional cough. No difficulty breathing. Denies nausea vomiting diarrhea or abdominal pain. No drainage from the ear. History is assisted by the patient's parents..    Review of systems:    A review of systems was performed and findings relevant to these symptoms are included in the HPI.     PAST HISTORIES:    ALLERGIES:  No Known Allergies    MEDICATIONS:  Current Outpatient Medications   Medication Sig   • amoxicillin (AMOXIL) 400 MG/5ML suspension Take 11 mLs by mouth 2 times daily for 10 days.   • polyethylene glycol (MIRALAX) powder 8.5 g in 4 ounces of fluid once or twice daily as needed for constipation.     No current facility-administered medications for this visit.        Past Medical History:   Diagnosis Date   • Normal  (single liveborn) 2014   • Otitis media           OBJECTIVE:  Physical Exam:    Visit Vitals  Pulse 93   Temp 99.4 °F (37.4 °C) (Temporal)   Resp 18   Ht 3' 9\" (1.143 m)   Wt 19.5 kg   SpO2 99%   BMI 14.93 kg/m²        CONSTITUTIONAL: Well-hydrated, well-nourished female who appears to be in no acute distress. Awake, alert and cooperative.  HEENT: Normocephalic. No facial swelling or tenderness. EOMI, PERRLA, conjunctiva clear. Left TM is normal with normal external auditory canal. Right TM is erythematous and bulging with purulent fluid behind the TM consistent with otitis media. External auditory canal is clear.. External ears without deformities. Hearing is grossly normal. Nose without deformities. There is moist nasal mucosa. Throat is clear with moist mucous membranes.   NECK: Bilateral anterocervical adenopathy is present. Minimal tenderness. No nuchal rigidity.. There is full range of motion. There is no tenderness  [Disease: _____________________] : Disease: [unfilled] noted.  LUNGS: Clear to auscultation bilaterally. No wheezes, rales or rhonchi noted.   CARDIOVASCULAR: Regular rate and rhythm with normal S1 and S2. There are no murmurs auscultated.     SKIN: Warm, dry, intact without rash or lesion.  NEUROLOGIC: Alert , smiling and quite cooperative. Cranial nerves intact. Age-appropriate behavior.      URGENT CARE COURSE:  Patient has right otitis media-placed on amoxicillin. Advised OTC analgesics and follow-up with PCP or return to clinic if not improving with treatment.    Assessment:  1. Right acute otitis media          PLAN:  Orders Placed This Encounter   • amoxicillin (AMOXIL) 400 MG/5ML suspension       FOLLOW-UP:  PCP p.r.n.   PATIENT INSTRUCTIONS:  Amoxicillin for 10 days. OTC analgesics p.r.n.    The patient was advised to follow up with primary physician or to recheck with the urgent care clinic sooner if symptoms get worse or if new symptoms appear.    The mother and father indicated understanding of the diagnosis and agreed with the plan of care.     [M: ___] : M[unfilled] [AJCC Stage: ____] : AJCC Stage: [unfilled] [ECOG Performance Status: 1 - Restricted in physically strenuous activity but ambulatory and able to carry out work of a light or sedentary nature] : Performance Status: 1 - Restricted in physically strenuous activity but ambulatory and able to carry out work of a light or sedentary nature, e.g., light house work, office work [de-identified] :  Marilynn [id number]  Patient is a 60-year-old Lithuanian-speaking female from [facility] with a past medical history of hypertension and is presenting to medical oncology for an initial conusltation regarding management for a new diagnosis of metastatic thyroid cancer. She states she noticed a right neck mass about 5-6 months ago that has been progressively growing and now causing mild discomfort. She was admitted to [facility] 4/12/25 work further work-up. There, she was found to have level 4 lymphadenopathy, the largest a 4.3 x 4.7 x 4.6 cm nectrotic/cystic mass. A 1.7cm right thyroid mass also seen. CT chest with innumerable pulmonary metastases. She had a lymph node biopsy on 4/16/25 which revealed metastatic papillary thyroid carcinoma. She was evaluated by ENT Dr. Bethea who recommended thyroidectomy and bilateral neck dissection followed by DOMINGO. She has bulky disease. Denies issues with breathig or swallowing. No family history of cancers or blood diseases. Non-smoker, no EtOH or other drugs. No history of radiation or toxin exposure.  [de-identified] : Papillary Thyroid Carcinoma  [de-identified] : TSH 9.04, free T4 0.8 [de-identified] : 5/2/25: Initial visit with medical oncology.   5/13/25: no new symptoms, persistent rt neck mass, no dysphagia, dyspnea or dysphonia  6/13/25:   s/p TT, CND, B/L SND II-IV, reimplantation para 5/21/2025 presents for follow-up. Most recent labs 5/30/2025 Intact PTH 6; Calcium and Ionized Calcium WNL. Pt is calcium carbonate 1250 mg (500 mg elemental calcium) oral tablet: 2 tab(s) TID, levothyroxine 112 mcg (0.112 mg) oral tablet: 1 tab(s) orally once a day, and Calcitriol 0.25 mcg oral capsule: 1 cap(s) orally 2 times a day. Pt has an endo appt 7/31/2025 Patient reports Right neck pain/stiffness, Right shoulder pain and upper back pain since the surgery. Eating/drinking without any difficulty. Patient denies globus sensation, dysphagia, odynophagia, dyspnea, dysphonia, hemoptysis or otalgia. Denies recent fevers/infections, chills, night sweats, unintentional weight loss. TG pre- 253 Surgical path report  . Thyroid, total thyroidectomy - Papillary thyroid carcinoma, see synoptic summary - Carcinoma is present on circumferential margins - Adenomatoid nodule (Thyroid follicular nodular disease)  4. Lymph nodes and thymus, central compartment, neck dissection and cervical thymectomy - 13 lymph nodes positive for metastatic carcinoma, with extranodal extension - Thymic tissue is also seen  5. Lymph nodes, additional left level 4, excision -  1 out of 2 lymph nodes positive for metastatic carcinoma

## 2025-06-24 NOTE — REVIEW OF SYSTEMS
[Negative] : Allergic/Immunologic [Dysphagia: Grade 0] : Dysphagia: Grade 0 [Nausea: Grade 0] : Nausea: Grade 0 [Vomiting: Grade 0] : Vomiting: Grade 0 [Fatigue: Grade 0] : Fatigue: Grade 0 [Neck Edema: Grade 0] : Neck Edema: Grade 0 [Tinnitus - Grade 0] : Tinnitus - Grade 0 [Blurred Vision: Grade 0] : Blurred Vision: Grade 0 [Xerostomia: Grade 0] : Xerostomia: Grade 0 [Oral Pain: Grade 0] : Oral Pain: Grade 0 [Dysgeusia: Grade 0] : Dysgeusia: Grade 0 [Cough: Grade 0] : Cough: Grade 0 [Dyspnea: Grade 0] : Dyspnea: Grade 0 [Hoarseness: Grade 0] : Hoarseness: Grade 0 [Voice Alteration: Grade 0] : Voice Alteration: Grade 0 [Pruritus: Grade 0] : Pruritus: Grade 0

## 2025-06-26 NOTE — PHYSICAL EXAM
[Alert] : alert [Well Nourished] : well nourished [Healthy Appearance] : healthy appearance [No Acute Distress] : no acute distress [EOMI] : extra ocular movement intact [PERRL] : pupils equal, round and reactive to light [No Lid Lag] : no lid lag [Normal Hearing] : hearing was normal [No Thyroid Nodules] : no palpable thyroid nodules [No Accessory Muscle Use] : no accessory muscle use [Normal Rate and Effort] : normal respiratory rate and effort [Clear to Auscultation] : lungs were clear to auscultation bilaterally [Normal Rate] : heart rate was normal [Regular Rhythm] : with a regular rhythm [No Edema] : no peripheral edema [Pedal Pulses Normal] : the pedal pulses are present [Normal Bowel Sounds] : normal bowel sounds [Not Tender] : non-tender [Soft] : abdomen soft [Spine Straight] : spine straight [No Involuntary Movements] : no involuntary movements were seen [No Tremors] : no tremors [Oriented x3] : oriented to person, place, and time [Normal Affect] : the affect was normal [Normal Insight/Judgement] : insight and judgment were intact [Normal Mood] : the mood was normal [Well Healed Scar] : well healed scar [Normal Gait] : normal gait [de-identified] : -Gale [de-identified] : -Yaakov

## 2025-06-26 NOTE — REASON FOR VISIT
[Initial Evaluation] : an initial evaluation [Hypothyroidism] : hypothyroidism [Hypoparathyroidism] : hypoparathyroidism [Language Line ] : provided by Language Line   [Time Spent: ____ minutes] : Total time spent using  services: [unfilled] minutes. The patient's primary language is not English thus required  services. [Interpreters_IDNumber] : [id number] [Interpreters_FullName] : Tiffanie [TWNoteComboBox1] : Eritrean

## 2025-06-26 NOTE — ASSESSMENT
[FreeTextEntry1] : 60F with history of HTN, metastatic PTC now s/p total thyroidectomy with CND and b/l SND with reimplantation of parathyroid glands on 5/21. Hospital course c/b post-operative hypocalcemia though asx. Patient being seen for post-hospital visit to establish care.  #Metastatic PTC #s/p total thyroidectomy #Postoperative hypothyroidism - Dx on FNA following [facility] hospitalization in 4/2025 (had progressively growing R neck mass x 5-6 mo prior) - CT neck with 4.3 x 4.7 x 4.6 cm R level ROSMERY cystic/solid mass with punctate calcifications and subcm lung nodules - FNA revealed PTC, now s/p total thyroidecftomy 5/21/25 with CND and b/l SND with reimplantation of parathyroids - 4/2025 TSH 7, FT4 0.8,  (elevated) with TGAb 17.5 - Started on LT4 112mcg, taking at 5am with calcium supplement - 6/2025 TSH 1.53, unsuppressed - 6/2025 TG 30.20, TGAb 17.9 - Planned for RT x6 weeks with Dr. Wheeler starting in July PLAN: - Increase LT4 to 125 mcg. Instructed patient to take on empty stomach, 30 min before food or other meds, and 4 hours prior to calcium supplement - Goal TSH <0.10 given high risk PTC - Will repeat TFTs next visit - Repeat TG/TGAb once patient returns from [facility] in January 2026  #Postoperative hypoparathyroidism  #Hypocalcemia  - s/p total thyroidectomy 5/21/25  - 5/21/25 Ca 9.1 with PTH 3 - 6/24 Ca 9.6 (high normal) with PTH 6 (low) - Currently on Oyster shell calcium 1000 mg tidac, Rocal 0.25 mcg bid  PLAN: - Obtain 24h urine calcium with 24h urine creatinine (instructions provided in Icelandic) - if hypocalciuric can continue with current doses of medications; if hypercalciuric will add thiazide and reduce dose of current meds - Change Oyster shell calcium to 1500 mg bid with lunch and dinner - c/w calcitriol 0.25 mcg bid  Of note, pt is planning to go to [facility] from end of September 2025 to mid-January 2026.  Will plan to see her back in clinic at the end of August 2025.  Case seen and d/w Dr. Martinez Banda MD Endocrinology Fellow

## 2025-06-26 NOTE — REVIEW OF SYSTEMS
[Neck Pain] : neck pain [Fatigue] : no fatigue [Decreased Appetite] : appetite not decreased [Recent Weight Gain (___ Lbs)] : no recent weight gain [Recent Weight Loss (___ Lbs)] : no recent weight loss [Blurred Vision] : no blurred vision [Dysphagia] : no dysphagia [Dysphonia] : no dysphonia [Chest Pain] : no chest pain [Palpitations] : no palpitations [Shortness Of Breath] : no shortness of breath [Nausea] : no nausea [Constipation] : no constipation [Abdominal Pain] : no abdominal pain [Vomiting] : no vomiting [Diarrhea] : no diarrhea [Polyuria] : no polyuria [Muscle Weakness] : no muscle weakness [Pain/Numbness of Digits] : no pain/numbness of digits [Polydipsia] : no polydipsia

## 2025-06-26 NOTE — HISTORY OF PRESENT ILLNESS
[FreeTextEntry1] : 60F with history of HTN, metastatic PTC now s/p total thyroidectomy with CND and b/l SND with reimplantation of parathyroid glands on 5/21. Hospital course c/b post-operative hypocalcemia though asx. Patient being seen for post-hospital visit to establish care.  Per hospital note: Pt recently dx with PTC on FNA following [facility] hospitalization in 4/2025. Pt had noticed progressively growing R neck mass x 5-6 mo prior to initial presentation. Work up at [facility] showed 4.3 x 4.7 x 4.6 cm R level ROSMERY cystic/solid mass with punctate calcifications and subcm lung nodules on CT neck. FNA at that time revealed PTC.  Labs from 4/2025 hospitalization showed hypothyroidism with TSH 7 and FT4 0.8. TG elevated 207 with TGAb 17.5.  s/p total thyroidectomy 5/21 with CND and b/l SND with reimplantation of parathyroids. 5/21 Ca 9.1 with PTH 3 During hospitalization, patient with hypocalcemia but asx. Was started on calcium carbonate 2500 mg tidac, Rocal 0.5 mcg bid, cholecalciferol 2000 units daily, and PO LT4 112 mcg   Today's visit: Currently taking Oyster shell calcium 1000 mg tid, calcitriol 0.25 mcg bid and LT4 112 mcg. Says she was not discharged on vitamin D3.  Is taking LT4 112 mcg with calcium at 5am.  6/2025 labs:  Ca 6/24 9.6, iPTH 6 TSH 1.53 TG 30.20 (from 253), TGAb 17.9 (from <15) Says that prior to dx, she was not experiencing any sxs. Reports perioral numbness since the surgery as well as some neck swelling, no dysphagia or SOB. Denies extremity numbness/tingling, spasm/twitching, weakness.   [facility] vs. [facility]. Patient is planned for RT x6 weeks starting in July  FHx No thyroid cancer or other thyroid disease  SHx No tobacco or alcohol use

## 2025-07-03 NOTE — REASON FOR VISIT
[Consideration of Curative Therapy] : consideration of curative therapy for [Other: ___] : [unfilled] [Interpreters_IDNumber] : [id number] [Interpreters_FullName] : Haydee

## 2025-07-03 NOTE — PROCEDURE
[Gag Reflex] : gag reflex preventing mirror examination [None] : none [Flexible Endoscope] : examined with the flexible endoscope [Serial Number: ___] : Serial Number: [unfilled] [de-identified] : After patient consents, a FFL is performed.  No lesions in the NPx, OPx, HPx or larynx.  VC are mobile, airway patent.

## 2025-07-03 NOTE — PHYSICAL EXAM
[] : no respiratory distress [Respiration, Rhythm And Depth] : normal respiratory rhythm and effort [Exaggerated Use Of Accessory Muscles For Inspiration] : no accessory muscle use [Normal] : oriented to person, place and time, the affect was normal, the mood was normal and not anxious [de-identified] : neck supple, scar well healed

## 2025-07-03 NOTE — REASON FOR VISIT
[Consideration of Curative Therapy] : consideration of curative therapy for [Other: ___] : [unfilled] [Interpreters_IDNumber] : [id number] [Interpreters_FullName] : Haydee

## 2025-07-03 NOTE — REASON FOR VISIT
[Consideration of Curative Therapy] : consideration of curative therapy for [Other: ___] : [unfilled] [Interpreters_IDNumber] : [id number] [Interpreters_FullName] : Haydee

## 2025-07-03 NOTE — HISTORY OF PRESENT ILLNESS
[FreeTextEntry1] : Ms. Sauceda is a 59 yo woman with newly digagnosed metastatic thyroid cancer.   Her HPI as I understand it is summarized below:  She presented to [facility] hospital with a right neck mass in April of this year. She had had the swelling for approxmately 5 months and noted it to be slowly getting bigger. It was also mildly painful.   4/11/2025: CT Neck- Large cystic/necrotic mass with internal enhancing soft tissue in right level 4A measuring 4.3 x 4.7 x 4.6 cm, right level 2B measuring 2.2 x 1.6 x 2.2 cm, right level 4B 2.1 x 3.1 x 1.6 cm, left level 4A 2 x 2.3 x 1.8 cm.  4/12/2025: US thyroid- right lobe 3.2 x 2.0x 1.7 cm lesion. Loss of fat plane between right level 4A mass and sternoclenomastoid muscle and associated with compression of internal jugular vein. Subcentimeter nodules in lung apices.   4/12/2025: CT CAP- innumerable bilateral pulmonary nodules largest measuring 8 mm in the right middle lobe concerning for metastatatic disease.  4/19/2025: right mid and Cervical level 4 right lymph node biopsy showed papillary thyroid carcinoma  5/20/2025: She underwent a total thyroidectomy with central compartment neck dissection with cervical thymectomy, reimplanation of left superior parathyroid gland, and bilateral neck dissection with Dr. Harp. Pathology showed Papillary thyroid carcinoma present on the circumferential margins, 13 central lymph nodes positive, 5/16 right lymph nodes positive with extranodal extension noted. Left lymph node dissection showed 1/7 level 3 lymph nodes positive with extranodal extension, 3/12 Level 4 lymph nodes positive, and 2/4 level 5A lymph nodes positive. Total 25 bilateral lymph nodes in right II-V and left III-V, largest measuring 9.3 cm. pT2N1b  6/24/2025: MS. Sauceda presents today for discussion of radiation therapy for metastatic thyroid cancer. She follows with Dr. Wheeler. DOMINGO vs RT discussed. She was discussed at Tumor board. NGS on tumor was sent 5/13. She also ordered a PET/CT  which is scheduled for July. She is healing from her surgery well with some skin tightness at the scar.

## 2025-07-03 NOTE — HISTORY OF PRESENT ILLNESS
[FreeTextEntry1] : Ms. Sauceda is a 59 yo woman with newly digagnosed metastatic thyroid cancer.   Her HPI as I understand it is summarized below:  She presented to [facility] hospital with a right neck mass in April of this year. She had had the swelling for approxmately 5 months and noted it to be slowly getting bigger. It was also mildly painful.   4/11/2025: CT Neck- Large cystic/necrotic mass with internal enhancing soft tissue in right level 4A measuring 4.3 x 4.7 x 4.6 cm, right level 2B measuring 2.2 x 1.6 x 2.2 cm, right level 4B 2.1 x 3.1 x 1.6 cm, left level 4A 2 x 2.3 x 1.8 cm.  4/12/2025: US thyroid- right lobe 3.2 x 2.0x 1.7 cm lesion. Loss of fat plane between right level 4A mass and sternoclenomastoid muscle and associated with compression of internal jugular vein. Subcentimeter nodules in lung apices.   4/12/2025: CT CAP- innumerable bilateral pulmonary nodules largest measuring 8 mm in the right middle lobe concerning for metastatatic disease.  4/19/2025: right mid and Cervical level 4 right lymph node biopsy showed papillary thyroid carcinoma  5/20/2025: She underwent a total thyroidectomy with central compartment neck dissection with cervical thymectomy, reimplanation of left superior parathyroid gland, and bilateral neck dissection with Dr. Harp. Pathology showed Papillary thyroid carcinoma present on the circumferential margins, 13 central lymph nodes positive, 5/16 right lymph nodes positive with extranodal extension noted. Left lymph node dissection showed 1/7 level 3 lymph nodes positive with extranodal extension, 3/12 Level 4 lymph nodes positive, and 2/4 level 5A lymph nodes positive. Total 25 bilateral lymph nodes in right II-V and left III-V, largest measuring 9.3 cm. pT2N1b  6/24/2025: MS. Sauceda presents today for discussion of radiation therapy for metastatic thyroid cancer. She follows with Dr. Wheeler. DOMINGO vs RT discussed. She was discussed at Tumor board. NGS on tumor was sent 5/13. She also ordered a PET/CT  which is scheduled for July. She is healing from her surgery well with some skin tightness at the scar.

## 2025-07-03 NOTE — PHYSICAL EXAM
[] : no respiratory distress [Respiration, Rhythm And Depth] : normal respiratory rhythm and effort [Exaggerated Use Of Accessory Muscles For Inspiration] : no accessory muscle use [Normal] : oriented to person, place and time, the affect was normal, the mood was normal and not anxious [de-identified] : neck supple, scar well healed

## 2025-07-03 NOTE — PROCEDURE
[Gag Reflex] : gag reflex preventing mirror examination [None] : none [Flexible Endoscope] : examined with the flexible endoscope [Serial Number: ___] : Serial Number: [unfilled] [de-identified] : After patient consents, a FFL is performed.  No lesions in the NPx, OPx, HPx or larynx.  VC are mobile, airway patent.

## 2025-07-03 NOTE — PHYSICAL EXAM
[] : no respiratory distress [Respiration, Rhythm And Depth] : normal respiratory rhythm and effort [Exaggerated Use Of Accessory Muscles For Inspiration] : no accessory muscle use [Normal] : oriented to person, place and time, the affect was normal, the mood was normal and not anxious [de-identified] : neck supple, scar well healed

## 2025-07-03 NOTE — HISTORY OF PRESENT ILLNESS
[FreeTextEntry1] : Ms. Sauceda is a 61 yo woman with newly digagnosed metastatic thyroid cancer.   Her HPI as I understand it is summarized below:  She presented to [facility] hospital with a right neck mass in April of this year. She had had the swelling for approxmately 5 months and noted it to be slowly getting bigger. It was also mildly painful.   4/11/2025: CT Neck- Large cystic/necrotic mass with internal enhancing soft tissue in right level 4A measuring 4.3 x 4.7 x 4.6 cm, right level 2B measuring 2.2 x 1.6 x 2.2 cm, right level 4B 2.1 x 3.1 x 1.6 cm, left level 4A 2 x 2.3 x 1.8 cm.  4/12/2025: US thyroid- right lobe 3.2 x 2.0x 1.7 cm lesion. Loss of fat plane between right level 4A mass and sternoclenomastoid muscle and associated with compression of internal jugular vein. Subcentimeter nodules in lung apices.   4/12/2025: CT CAP- innumerable bilateral pulmonary nodules largest measuring 8 mm in the right middle lobe concerning for metastatatic disease.  4/19/2025: right mid and Cervical level 4 right lymph node biopsy showed papillary thyroid carcinoma  5/20/2025: She underwent a total thyroidectomy with central compartment neck dissection with cervical thymectomy, reimplanation of left superior parathyroid gland, and bilateral neck dissection with Dr. Harp. Pathology showed Papillary thyroid carcinoma present on the circumferential margins, 13 central lymph nodes positive, 5/16 right lymph nodes positive with extranodal extension noted. Left lymph node dissection showed 1/7 level 3 lymph nodes positive with extranodal extension, 3/12 Level 4 lymph nodes positive, and 2/4 level 5A lymph nodes positive. Total 25 bilateral lymph nodes in right II-V and left III-V, largest measuring 9.3 cm. pT2N1b  6/24/2025: MS. Sauceda presents today for discussion of radiation therapy for metastatic thyroid cancer. She follows with Dr. Wheeler. DOMINGO vs RT discussed. She was discussed at Tumor board. NGS on tumor was sent 5/13. She also ordered a PET/CT  which is scheduled for July. She is healing from her surgery well with some skin tightness at the scar.

## 2025-07-03 NOTE — REASON FOR VISIT
[Consideration of Curative Therapy] : consideration of curative therapy for [Other: ___] : [unfilled] [Interpreters_IDNumber] : [id number] [Interpreters_FullName] : Haydee

## 2025-07-03 NOTE — PHYSICAL EXAM
[] : no respiratory distress [Respiration, Rhythm And Depth] : normal respiratory rhythm and effort [Exaggerated Use Of Accessory Muscles For Inspiration] : no accessory muscle use [Normal] : oriented to person, place and time, the affect was normal, the mood was normal and not anxious [de-identified] : neck supple, scar well healed

## 2025-07-03 NOTE — HISTORY OF PRESENT ILLNESS
[FreeTextEntry1] : Ms. Sauceda is a 61 yo woman with newly digagnosed metastatic thyroid cancer.   Her HPI as I understand it is summarized below:  She presented to [facility] hospital with a right neck mass in April of this year. She had had the swelling for approxmately 5 months and noted it to be slowly getting bigger. It was also mildly painful.   4/11/2025: CT Neck- Large cystic/necrotic mass with internal enhancing soft tissue in right level 4A measuring 4.3 x 4.7 x 4.6 cm, right level 2B measuring 2.2 x 1.6 x 2.2 cm, right level 4B 2.1 x 3.1 x 1.6 cm, left level 4A 2 x 2.3 x 1.8 cm.  4/12/2025: US thyroid- right lobe 3.2 x 2.0x 1.7 cm lesion. Loss of fat plane between right level 4A mass and sternoclenomastoid muscle and associated with compression of internal jugular vein. Subcentimeter nodules in lung apices.   4/12/2025: CT CAP- innumerable bilateral pulmonary nodules largest measuring 8 mm in the right middle lobe concerning for metastatatic disease.  4/19/2025: right mid and Cervical level 4 right lymph node biopsy showed papillary thyroid carcinoma  5/20/2025: She underwent a total thyroidectomy with central compartment neck dissection with cervical thymectomy, reimplanation of left superior parathyroid gland, and bilateral neck dissection with Dr. Harp. Pathology showed Papillary thyroid carcinoma present on the circumferential margins, 13 central lymph nodes positive, 5/16 right lymph nodes positive with extranodal extension noted. Left lymph node dissection showed 1/7 level 3 lymph nodes positive with extranodal extension, 3/12 Level 4 lymph nodes positive, and 2/4 level 5A lymph nodes positive. Total 25 bilateral lymph nodes in right II-V and left III-V, largest measuring 9.3 cm. pT2N1b  6/24/2025: MS. Sauceda presents today for discussion of radiation therapy for metastatic thyroid cancer. She follows with Dr. Wheeler. DOMINGO vs RT discussed. She was discussed at Tumor board. NGS on tumor was sent 5/13. She also ordered a PET/CT  which is scheduled for July. She is healing from her surgery well with some skin tightness at the scar.

## 2025-07-08 NOTE — REASON FOR VISIT
[Post-Operative Visit] : a post-operative visit [Language Line ] : provided by Language Line   [Interpreters_IDNumber] : [id number] [Interpreters_FullName] : Pippa [TWNoteComboBox1] : Croatian [Family Member] : family member [Other: _____] : [unfilled] [FreeTextEntry2] : Metastatic Papillary thyroid carcinoma to lymph nodes.

## 2025-07-08 NOTE — REASON FOR VISIT
[Post-Operative Visit] : a post-operative visit [Language Line ] : provided by Language Line   [Interpreters_IDNumber] : [id number] [Interpreters_FullName] : Pippa [[email or web address]] : Guinean [Family Member] : family member [Other: _____] : [unfilled] [FreeTextEntry2] : Metastatic Papillary thyroid carcinoma to lymph nodes.

## 2025-07-08 NOTE — HISTORY OF PRESENT ILLNESS
[de-identified] : 60 year old woman s/p TT, CND, B/L SND II-IV, reimplantation para 5/21/2025 presents for follow-up.  6/24/2025 Intact PTH 6; Calcium and Ionized Calcium WNL. Oyster shell calcium 1500 mg bid,  calcitriol 0.25 mcg bid and LT4 125 mcg. Pt is being f/u with [facility] and  Dr. Beckham and Dr. Wheeler, where DOMINGO vs. RT was discussed. Patient is planned for RT x6 weeks starting in July 21/2025 pt is doing ok, no c.o no pain, no dysphagia Right neck pain/stiffness, Right shoulder pain and upper back pain since the surgery- all resovled.  Eating/drinking without any difficulty.  Patient denies globus sensation, dysphagia, odynophagia, dyspnea, dysphonia, hemoptysis or otalgia. Denies recent fevers/infections, chills, night sweats, unintentional weight loss.

## 2025-07-08 NOTE — HISTORY OF PRESENT ILLNESS
[de-identified] : 60 year old woman s/p TT, CND, B/L SND II-IV, reimplantation para 5/21/2025 presents for follow-up.  6/24/2025 Intact PTH 6; Calcium and Ionized Calcium WNL. Oyster shell calcium 1500 mg bid,  calcitriol 0.25 mcg bid and LT4 125 mcg. Pt is being f/u with [facility] and  Dr. Beckham and Dr. Wheeler, where DOMINGO vs. RT was discussed. Patient is planned for RT x6 weeks starting in July 21/2025 pt is doing ok, no c.o no pain, no dysphagia Right neck pain/stiffness, Right shoulder pain and upper back pain since the surgery- all resovled.  Eating/drinking without any difficulty.  Patient denies globus sensation, dysphagia, odynophagia, dyspnea, dysphonia, hemoptysis or otalgia. Denies recent fevers/infections, chills, night sweats, unintentional weight loss.

## 2025-07-08 NOTE — PHYSICAL EXAM
[Midline] : trachea located in midline position [Laryngoscopy Performed] : laryngoscopy was performed, see procedure section for findings [Normal] : no rashes [de-identified] : Incision healing well, postsurgical changes.  No collections, no LAD.

## 2025-07-08 NOTE — PHYSICAL EXAM
[Midline] : trachea located in midline position [Laryngoscopy Performed] : laryngoscopy was performed, see procedure section for findings [Normal] : no rashes [de-identified] : Incision healing well, postsurgical changes.  No collections, no LAD.